# Patient Record
Sex: FEMALE | Race: OTHER | URBAN - METROPOLITAN AREA
[De-identification: names, ages, dates, MRNs, and addresses within clinical notes are randomized per-mention and may not be internally consistent; named-entity substitution may affect disease eponyms.]

---
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
## 2021-01-05 ENCOUNTER — TELEPHONE (OUTPATIENT)
Dept: FAMILY MEDICINE CLINIC | Age: 21
End: 2021-01-05

## 2021-01-05 NOTE — TELEPHONE ENCOUNTER
Pt has not returned O&P samples and order is in overdue results folder. Routing a message to provider to see if pt order still needs follow up and to call back nurse.  Tato Justin RN

## 2021-01-11 ENCOUNTER — OFFICE VISIT (OUTPATIENT)
Dept: FAMILY MEDICINE CLINIC | Age: 21
End: 2021-01-11

## 2021-01-11 DIAGNOSIS — Z71.3 DIETARY COUNSELING AND SURVEILLANCE: Primary | ICD-10-CM

## 2021-01-11 PROCEDURE — 97802 MEDICAL NUTRITION INDIV IN: CPT

## 2021-01-11 NOTE — PROGRESS NOTES
Initial Appointment  Virtual Visit - Patient consents to telephone visit. : Veronique Garcia    DATE: 2021    REFERRING PROVIDER: Efraín Slaeh MD  NAME: Roseanna Bucio : 2000 AGE: 24 y.o. GENDER: female  REASON FOR VISIT: Weight Management    ASSESSMENT: Patient shares that she has tried every possible to gain weight but still weights 90 lbs. Her goal weight is 105 - 110 lbs. Past Medical Hx: Underweight BMI      LABS:   Lab Results   Component Value Date/Time    Hemoglobin A1c 5.2 2020 10:12 PM     BP Readings from Last 1 Encounters:   20 112/68     MEDICATIONS/SUPPLEMENTS:     Prior to Admission medications    Medication Sig Start Date End Date Taking? Authorizing Provider   pyrantel pamoate (PIN-X) 250 mg chew chewable tablet 2 tablets PO once a day x 1 day 20   Chato Garcia MD   ergocalciferol (ERGOCALCIFEROL) 1,250 mcg (50,000 unit) capsule Take 1 Cap by mouth every seven (7) days. 20   Chato Garcia MD       FOOD ALLERGIES/INTOLERANCES: No known food allergies. ANTHROPOMETRICS:    Ht Readings from Last 1 Encounters:   20 5' 0.04\" (1.525 m) (5 %, Z= -1.66)*     * Growth percentiles are based on CDC (Girls, 2-20 Years) data. Wt Readings from Last 1 Encounters:   20 90 lb 6.4 oz (41 kg) (<1 %, Z= -2.77)*     * Growth percentiles are based on CDC (Girls, 2-20 Years) data. BMI: 17.62 Category: Underweight    Reported Wt Hx:  Wt Readings from Last 4 Encounters:   20 90 lb 6.4 oz (41 kg) (<1 %, Z= -2.77)*   20 90 lb 3.2 oz (40.9 kg) (<1 %, Z= -2.79)*     * Growth percentiles are based on CDC (Girls, 2-20 Years) data.      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 NUTRITION INTERVENTION:  Discussed why weight can be maintained, related to the amount of physical activity and the amount of food consumed. We discussed ways to add in more energy to the day at meal times and between meals. Gave specific examples to add in ~500 kcals/day, which would be ~ + 1lb/week. PATIENT GOALS:  4-5 days a week:  - Smoothie after dinner with frozen banana,  2% milk, and strawberries   - Drink ensure in between breakfast and lunch  - Add in greek yogurt with nuts on top with breakfast    Specific tips and techniques to facilitate compliance with above recommendations were provided and discussed. Pt was strongly encouraged to begin making necessary changes now. Will follow-up with Anabel Duenas in 1 month to review progress towards goals. Appointment scheduled for February 10, 2021.           Micah Garcia RD

## 2021-01-15 NOTE — TELEPHONE ENCOUNTER
Tc to the pt int [facility]. the pt was asked about the O&P test the provider had recommended she have done, and if she was still planning to do it or what was her plan. The pt stated she had been given a medication and she had not been able to  the medication to take it, until this week, so she has not been able to obtain the stool specimen yet, and with work and all it is complicated. The pt stated, but she is going to try the best she can to get the stool and bring it in on Tues at [facility] 01/19/21. The pt was told a message would be sent to the provider.  Nalini Gonzales RN

## 2021-01-19 ENCOUNTER — LAB ONLY (OUTPATIENT)
Dept: FAMILY MEDICINE CLINIC | Age: 21
End: 2021-01-19

## 2021-01-19 ENCOUNTER — HOSPITAL ENCOUNTER (OUTPATIENT)
Dept: LAB | Age: 21
Discharge: HOME | End: 2021-01-19

## 2021-01-19 DIAGNOSIS — B77.9: ICD-10-CM

## 2021-01-19 PROCEDURE — 87177 OVA AND PARASITES SMEARS: CPT

## 2021-01-19 NOTE — PROGRESS NOTES
Patient was here to drop off Ova @ Parasites stool specimen as per Dr Cheli Teresa MD. As per patient, specimen was collected at home 1/19/2021 at 4:40 am.

## 2021-01-22 LAB
O+P SPEC MICRO: NORMAL
O+P STL CONC: NORMAL
SPECIMEN SOURCE: NORMAL

## 2023-05-15 RX ORDER — ERGOCALCIFEROL 1.25 MG/1
50000 CAPSULE ORAL
COMMUNITY
Start: 2020-11-23